# Patient Record
Sex: FEMALE | Race: BLACK OR AFRICAN AMERICAN | Employment: UNEMPLOYED | URBAN - METROPOLITAN AREA
[De-identification: names, ages, dates, MRNs, and addresses within clinical notes are randomized per-mention and may not be internally consistent; named-entity substitution may affect disease eponyms.]

---

## 2017-06-30 ENCOUNTER — HOSPITAL ENCOUNTER (OUTPATIENT)
Dept: GENERAL RADIOLOGY | Age: 82
Discharge: HOME | End: 2017-06-30
Attending: FAMILY MEDICINE
Payer: MEDICARE

## 2017-06-30 DIAGNOSIS — R13.10 DYSPHAGIA: ICD-10-CM

## 2017-06-30 PROCEDURE — G8996 SWALLOW CURRENT STATUS: HCPCS

## 2017-06-30 PROCEDURE — 74230 X-RAY XM SWLNG FUNCJ C+: CPT

## 2017-06-30 PROCEDURE — 92611 MOTION FLUOROSCOPY/SWALLOW: CPT

## 2017-06-30 PROCEDURE — G8998 SWALLOW D/C STATUS: HCPCS

## 2017-06-30 PROCEDURE — G8997 SWALLOW GOAL STATUS: HCPCS

## 2017-06-30 NOTE — PROGRESS NOTES
Aileen Stephens  [address]    Speech Pathology Modified barium swallow Study with cms g codes  Patient: Anna Barakat (80 y.o. female)  Date: 6/30/2017  Referring Provider:  Felty    SUBJECTIVE:   Patient's daughter attended [facility] with her. She reported that her mother has had a lot of issues with oral holding, especially with meds and foods, drinks she does not like. She reports that she can eat well if she likes the food. She is on mechanical soft, nectars at SNF after a hospitalization at [facility] due to weakness, dehydration. She also has h/o thrush, frequent UTIs, GERd, DM, thyroid issues. Daughter also reports a h/o CVAs. Patient with flat affect. She had a lot of difficulty transferring into [facility] chair. OBJECTIVE:   Past Medical History:   No past medical history on file. No past surgical history on file. Current Dietary Status:  [facility] soft, nectars  Radiologist: Bentley Coronel  Film Views: Lateral  Patient Position: upright in Ha[place] chair    Trial 1: Trial 2:   Consistency Presented: Nectar thick liquid; Thin liquid     How Presented: SLP-fed/presented;Spoon;Straw      ORAL PHASE:      Bolus Acceptance: reduced oral opening. Able to take tiny repetitive sips from straw. Bolus Formation/Control: Impaired: She had partial a-p propulsion of all types of boluses 2/3 of the way to back of tongue, but then showed repetitive lingual pumping that kept material in her mouth. She rarely let material reach the pharynx. This appeared to be behavioral or cognitive in nature. Some of thin liquids did reach the pharynx, and occasional some of her saliva. However, she usually has enough lingual pumping that saliva mixes with bolus and bolus size increases to the point where she expectorates.   :     Propulsion: Delayed (# of seconds); Discoordination     Oral Residue: in lateral sulci bilaterally and mostly a thick coating on tongue.  She still has residue from cranberry juice (NTL) that she took at least an hour before. PHARYNGEAL PHASE:      Initiation of Swallow: Triggered at vallecula with thins. Really unable to assess other consistencies, as she did not propel most material into pharynx. Timing: Pooling 1-5 sec     Penetration: None     Aspiration/Timing: No evidence of aspiration     Pharyngeal Clearance: No residue                               Trial 3: Trial 4:                            :    :                                                                        Decreased Tongue Base Retraction?: No  Laryngeal Elevation: WFL (within functional limits)  Aspiration/Penetration Score: 1 (No penetration or aspiration-Contrast does not enter the airway)                     In compliance with CMSs Claims Based Outcome Reporting, the following G-code set was chosen for this patient based the use of the NOMS functional outcome to quantify this patient's level of swallowing impairment. Using the NOMS, the patient was determined to be at level 3 for swallow function which correlates with the CL= 60-79% level of severity. Based on the objective assessment provided within this note, the current, goal, and discharge g-codes are as follows:    Swallow  Swallowing:   Swallow Current Status CL= 60-79%   Swallow Goal Status CL= 60-79%   Swallow D/C Status CL= 60-79%        NOMS Swallowing Levels:  Level 1 (CN): NPO  Level 2 (CM): NPO but takes consistency in therapy  Level 3 (CL): Takes less than 50% of nutrition p.o. and continues with nonoral feedings; and/or safe with mod cues; and/or max diet restriction  Level 4 (CK):  Safe swallow but needs mod cues; and/or mod diet restriction; and/or still requires some nonoral feeding/supplements  Level 5 (CJ): Safe swallow with min diet restriction; and/or needs min cues  Level 6 (CI): Independent with p.o.; rare cues; usually self cues; may need to avoid some foods or needs extra time  Level 7 [facility]): Independent for all p.o.  VIKTOR. (2003). National Outcomes Measurement System (NOMS): Adult Speech-Language Pathology User's Guide. ASSESSMENT :  Based on the objective data described above, the patient presents with severe oral dysphagia, with a component of behavioral or cognitive issues. She had severe oral holding of all boluses, with repetitive lingual pumping. The boluses only increased in size, as it mixed with saliva and eventually she expectorated them. Pharyngeal swallow showed mild delay, but no residue. If this is her typical eating pattern, then it is doubtful that she can take enough PO to maintain nutrition. PLAN/RECOMMENDATIONS :  Diet per primary SLP. Daughter claims that she can eat some foods well if she likes them, but this was not able to be determined on this evaluation. In additional to cognitive issues, consider thrush as a complicating factor in her swallowing. If this can be evaluated and resolved, she may have better PO intake and successful propulsion of material into pharynx. COMMUNICATION/EDUCATION:   The above findings and recommendations were discussed with: patient's daughter who verbalized understanding.     Thank you for this referral.Tiffanie Goldstein, SLP  Time Calculation: 15 mins

## 2017-07-27 ENCOUNTER — HOSPITAL ENCOUNTER (OUTPATIENT)
Dept: LAB | Age: 82
Discharge: HOME | End: 2017-07-27